# Patient Record
Sex: MALE | NOT HISPANIC OR LATINO | Employment: PART TIME | URBAN - METROPOLITAN AREA
[De-identification: names, ages, dates, MRNs, and addresses within clinical notes are randomized per-mention and may not be internally consistent; named-entity substitution may affect disease eponyms.]

---

## 2019-06-27 ENCOUNTER — OFFICE VISIT (OUTPATIENT)
Dept: SLEEP MEDICINE | Facility: HOSPITAL | Age: 69
End: 2019-06-27

## 2019-06-27 ENCOUNTER — DOCUMENTATION (OUTPATIENT)
Dept: SLEEP MEDICINE | Facility: HOSPITAL | Age: 69
End: 2019-06-27

## 2019-06-27 VITALS — WEIGHT: 247 LBS | HEIGHT: 71 IN | BODY MASS INDEX: 34.58 KG/M2

## 2019-06-27 DIAGNOSIS — Z99.89 OSA ON CPAP: Primary | ICD-10-CM

## 2019-06-27 DIAGNOSIS — G47.33 OSA ON CPAP: Primary | ICD-10-CM

## 2019-06-27 PROCEDURE — G0463 HOSPITAL OUTPT CLINIC VISIT: HCPCS

## 2019-06-27 PROCEDURE — 99204 OFFICE O/P NEW MOD 45 MIN: CPT | Performed by: INTERNAL MEDICINE

## 2019-06-27 NOTE — PROGRESS NOTES
Patient seen in sleep clinic today with Dr. Leija. Per  Requested pap pressure change from fixed setting of 10cm to an auto setting of 8-14cm and to change humidification setting from adaptive to Fixed setting. Changes made in [employer] through patients active modem. MAB

## 2019-06-27 NOTE — PROGRESS NOTES
[facility] New Patient/Consultation       Reason for Consultation: NOEMI    Patient Care Team:  Vidya Grace APRN as PCP - General (Nurse Practitioner)  Olaf Leija MD as Consulting Physician (Sleep Medicine)    Chief complaint: NOEMI    History of present illness:    Thank you for asking me to see your patient.  The patient is a 69 y.o. male who has a history of NOEMI treated with CPAP +10.  Overnight polysomnogram performed 12/4/2012.  His weight was 263 pounds.  AHI severely abnormal at 40 events per hour.  For the last 2 months, the patient's been slobbering in his mask.  He has been unable to tolerate CPAP due to not obtaining a mask seal.  The patient goes to bed between 8 9 PM and awakens between 3 and 4 AM.  He might take a nap once a week.  He has no complaints of hypersomnolence and his [place] Sleepiness Scale is normal at 5.  When he wears his CPAP, he has no complaints of snoring or apnea or leg jerking at night.  He does awaken frequently and will use the restroom once or twice during the nighttime.    Review of Systems:    A complete review of systems was done and all were negative with the exception of frequent urination, postnasal drip, painful joints    History:  Past Medical History:   Diagnosis Date   • BPH (benign prostatic hyperplasia)    • Diabetes mellitus (CMS/[facility])    • Hypertension    • NOEMI (obstructive sleep apnea) 12/04/2012    Overnight polysomnogram with severe NOEMI, AHI 40 events per hour, weight 263 pounds   , History reviewed. No pertinent surgical history.,   Family History   Problem Relation Age of Onset   • Diabetes Other     and   Social History     Tobacco Use   • Smoking status: Current Every Day Smoker     Packs/day: 0.25     Start date: 1969   • Smokeless tobacco: Never Used   Substance Use Topics   • Alcohol use: No     Frequency: Never   • Drug use: No       Social History: He is retired.  3 cups of coffee a day.    Allergies:  Percocet  "[oxycodone-acetaminophen]     Medication Review: I reviewed his medication list    Vital Signs:    Vitals:    06/27/19 1134   Weight: 112 kg (247 lb)   Height: 180.3 cm (71\")      Body mass index is 34.45 kg/m².         Physical Exam:    Constitutional:  Well developed 69 y.o. male that appears in no apparent distress.  Awake & oriented times 3.  Normal mood with normal recent and remote memory and normal judgement.  Eyes:  Conjunctivae normal.  Oropharynx: Moist mucous membranes without exudate and a large tongue and uvula and moderate narrowing of the posterior pharyngeal opening and class II MP airway  Neck: Trachea midline  Respiratory: Effort is not labored  Cardiovascular: Radial pulse regular  Musculoskeletal: Gait appears normal, no digital clubbing evident, no pre-tibial edema    Results Review: DME is [facility] and he uses a full facemask.  Downloads between 3/29 and 6/26/2019 compliance 71%.  Prior to 6/5/2019 compliance greater than 90%.  Average usage 5 hours and 31 minutes.  Average AHI is normal without leak.  The patient uses CPAP +10.       Impression:   Severe Obstructive sleep apnea adequately treated with CPAP +10 with a history of good compliance and usage.  Since 6/6/2019, the patient has not been able to use his device due to increased slobbering.  The patient has no complaints of hypersomnolence.    Plan:  Good sleep hygiene measures should be maintained.  Weight loss would be beneficial in this patient who is obese.    Pathophysiology of NOEMI described to the patient.  Cardiovascular complications of untreated NOEMI also reviewed.      Previously, the patient was benefiting from therapy being provided.    The patient likes his present mask.  He just complains of the slobbering.  He is adapted humidity will be turned off and he can adjusted manually.  Additionally, will change to automatic mode between 8 and 14 cm water pressure.  I will see him back in 2-3 months.    Thank you for requesting me " to assist in this patient's care.       Olaf Leija MD  Sleep Medicine  07/06/19  8:09 AM

## 2019-06-30 PROBLEM — G47.33 OSA ON CPAP: Status: ACTIVE | Noted: 2019-06-30

## 2019-06-30 PROBLEM — Z99.89 OSA ON CPAP: Status: ACTIVE | Noted: 2019-06-30

## 2019-10-23 ENCOUNTER — APPOINTMENT (OUTPATIENT)
Dept: SLEEP MEDICINE | Facility: HOSPITAL | Age: 69
End: 2019-10-23

## 2020-01-16 ENCOUNTER — OFFICE VISIT (OUTPATIENT)
Dept: SLEEP MEDICINE | Facility: HOSPITAL | Age: 70
End: 2020-01-16

## 2020-01-16 VITALS — BODY MASS INDEX: 35.84 KG/M2 | WEIGHT: 256 LBS | HEIGHT: 71 IN

## 2020-01-16 DIAGNOSIS — G47.14 HYPERSOMNIA DUE TO MEDICAL CONDITION: ICD-10-CM

## 2020-01-16 DIAGNOSIS — G47.33 OSA ON CPAP: Primary | ICD-10-CM

## 2020-01-16 DIAGNOSIS — Z99.89 OSA ON CPAP: Primary | ICD-10-CM

## 2020-01-16 DIAGNOSIS — E66.01 CLASS 2 SEVERE OBESITY DUE TO EXCESS CALORIES WITH SERIOUS COMORBIDITY AND BODY MASS INDEX (BMI) OF 35.0 TO 35.9 IN ADULT (HCC): ICD-10-CM

## 2020-01-16 PROCEDURE — 99213 OFFICE O/P EST LOW 20 MIN: CPT | Performed by: INTERNAL MEDICINE

## 2020-01-16 PROCEDURE — G0463 HOSPITAL OUTPT CLINIC VISIT: HCPCS

## 2020-01-16 NOTE — PROGRESS NOTES
"Follow Up [facility] Note     Chief Complaint:  NOEMI     Primary Care Physician: Vidya Grace, BRANDEE    Interval History:   I last saw the patient in June 2019.  The patient is doing much better with pressure changes made.  However, he is complaining of drooling excessively.  He goes to bed at 8 PM and awakens at 4:30 AM.  He will use the restroom during that time.  [place] Sleepiness Scale is abnormal at 9.    Review of Systems:    A complete review of systems was done and all were negative with the exception of nasal congestion and postnasal drip    Social History:    Social History     Socioeconomic History   • Marital status:      Spouse name: Not on file   • Number of children: Not on file   • Years of education: Not on file   • Highest education level: Not on file   Tobacco Use   • Smoking status: Former Smoker     Packs/day: 0.25     Start date: 1969   • Smokeless tobacco: Never Used   Substance and Sexual Activity   • Alcohol use: No     Frequency: Never   • Drug use: No       Allergies:  Percocet [oxycodone-acetaminophen]     Medication Review: His list was reviewed.      Vital Signs:    Vitals:    01/16/20 1053   Weight: 116 kg (256 lb)   Height: 180.3 cm (71\")     Body mass index is 35.7 kg/m².    Physical Exam:    Constitutional:  Well developed 70 y.o. male that appears in no apparent distress.  Awake & oriented times 3.  Normal mood with normal recent and remote memory and normal judgement.  Eyes:  Conjunctivae normal.  Oropharynx:  moist mucous membranes without exudate and a large tongue and uvula and moderate narrowing of the posterior pharyngeal opening and class II Mallampati airway     Results Review:  DME is [facility] and he uses a fullface mask.  Downloads between 10/1 and 12/29/2019 compliance 66%.  Average usage is 5 hours and 39 minutes.  Average AHI is normal without leak.  Average AutoCPAP pressure is 10 and his auto CPAP is 8-14.       Impression:   Obstructive sleep " apnea adequately treated with auto CPAP with suboptimal compliance and good usage and some persistent complaints of hypersomnolence.    Plan:  Good sleep hygiene measures should be maintained.  Weight loss would be beneficial in this patient who is obese by BMI.  The patient is benefiting from the treatment being provided.     The patient should continue auto CPAP and he should use it every night.  Due to drooling, he has not to add water to his humidifier.  He will call me if he does not get better    The patient will call for any problems and will follow up in 6 months.      Olaf Leija MD  Sleep Medicine  01/19/20  1:14 PM

## 2020-01-19 PROBLEM — G47.14 HYPERSOMNIA DUE TO MEDICAL CONDITION: Status: ACTIVE | Noted: 2020-01-19

## 2020-01-19 PROBLEM — E66.01 CLASS 2 SEVERE OBESITY DUE TO EXCESS CALORIES WITH SERIOUS COMORBIDITY AND BODY MASS INDEX (BMI) OF 35.0 TO 35.9 IN ADULT (HCC): Status: ACTIVE | Noted: 2020-01-19

## 2020-07-16 ENCOUNTER — APPOINTMENT (OUTPATIENT)
Dept: SLEEP MEDICINE | Facility: HOSPITAL | Age: 70
End: 2020-07-16

## 2020-07-22 ENCOUNTER — APPOINTMENT (OUTPATIENT)
Dept: SLEEP MEDICINE | Facility: HOSPITAL | Age: 70
End: 2020-07-22

## 2021-03-09 DIAGNOSIS — Z23 IMMUNIZATION DUE: ICD-10-CM
